# Patient Record
Sex: FEMALE | Race: WHITE
[De-identification: names, ages, dates, MRNs, and addresses within clinical notes are randomized per-mention and may not be internally consistent; named-entity substitution may affect disease eponyms.]

---

## 2019-12-27 ENCOUNTER — HOSPITAL ENCOUNTER (EMERGENCY)
Dept: HOSPITAL 76 - ED | Age: 54
Discharge: HOME | End: 2019-12-27
Payer: MEDICAID

## 2019-12-27 VITALS — SYSTOLIC BLOOD PRESSURE: 109 MMHG | DIASTOLIC BLOOD PRESSURE: 53 MMHG

## 2019-12-27 DIAGNOSIS — H81.12: Primary | ICD-10-CM

## 2019-12-27 PROCEDURE — 99284 EMERGENCY DEPT VISIT MOD MDM: CPT

## 2019-12-27 PROCEDURE — 99282 EMERGENCY DEPT VISIT SF MDM: CPT

## 2019-12-27 NOTE — ED PHYSICIAN DOCUMENTATION
History of Present Illness





- Stated complaint


Stated Complaint: VOMITING





- Additonal information


Additional information: 





This is a 54-year-old female with a history of arthritis who presents with 

vertigo.  Patient states this morning she became vertiginous and if she moves 

her head suddenly she gets very nauseated and the room began spinning.  It calms

down if she stays still.  It is worse when she looks to the left.  She has had 

multiple episodes of vomiting after moving her head.  She denies abdominal pain,

chest pain, weakness numbness or vision changes.





Review of Systems


Constitutional: denies: Fever


Cardiac: denies: Chest pain / pressure


Respiratory: denies: Dyspnea


GI: denies: Abdominal Pain


: denies: Dysuria


Neurologic: denies: Generalized weakness





PD PAST MEDICAL HISTORY





- Past Medical History


Musculoskeletal: Osteoarthritis





- Present Medications


Home Medications: 


                                Ambulatory Orders











 Medication  Instructions  Recorded  Confirmed


 


Meclizine HCl 25 mg PO TID PRN #14 tab.chew 12/27/19 














- Allergies


Allergies/Adverse Reactions: 


                                    Allergies











Allergy/AdvReac Type Severity Reaction Status Date / Time


 


ibuprofen AdvReac  Unknown Verified 12/27/19 08:17














PD ED PE NORMAL





- Vitals


Vital signs reviewed: Yes





- General


General: Alert and oriented X 3, No acute distress





- HEENT


HEENT: PERRL





- Neck


Neck: Supple, no meningeal sign





- Cardiac


Cardiac: RRR





- Respiratory


Respiratory: Clear bilaterally





- Abdomen


Abdomen: Soft, Non tender, Non distended





- Derm


Derm: Warm and dry





- Extremities


Extremities: No deformity





- Neuro


Neuro: Alert and oriented X 3, CNs 2-12 intact, No motor deficit, No sensory 

deficit, Normal speech, Other (Positive [place]-Hallpike with nystagmus, vertigo, 

and vomiting on the left.  Negative on the right. No dysmetria or ataxia.)





- Psych


Psych: Normal mood, Normal affect





Results





- Vitals


Vitals: 


                               Vital Signs - 24 hr











  12/27/19 12/27/19





  08:12 08:17


 


Temperature 36.6 C 36.4 C L


 


Heart Rate 74 78


 


Respiratory 18 16





Rate  


 


Blood Pressure 116/60 109/53 L


 


O2 Saturation 92 91 L








                                     Oxygen











O2 Source                      Room air

















PD MEDICAL DECISION MAKING





- ED course


ED course: 





Patient presents with positional vertigo symptoms.  On exam she has a completely

normal neurologic exam, and her symptoms are fine at rest but when I do a 

[place]-Hallpike maneuver on the left she is floridly positive with nystagmus and 

vomiting.  She has no signs of central vertigo, this appears to be benign 

positional vertigo.  She is able to ambulate, she has no dysmetria or ataxia. 

After meclizine she is feeling greatly improved.  On repeat examination she 

feels comfortable and home.  I discussed treatment with meclizine as well as 

Epley maneuvers, I provided a instructional handout on Epley maneuvers that she 

would prefer not to do them here in the emergency department today.  I discussed

PCP follow-up and ENT follow-up if she is having persistent vertigo despite our 

treatment.  I also discussed return precautions for any signs of central vertigo

or stroke, such as weakness numbness facial droop, or any other new or 

concerning symptoms.  Patient agrees this plan was discharged home in care of 

family





Departure





- Departure


Disposition: 01 Home, Self Care


Clinical Impression: 


BPPV (benign paroxysmal positional vertigo)


Qualifiers:


 Laterality: left Qualified Code(s): H81.12 - Benign paroxysmal vertigo, left 

ear





Condition: Good


Instructions:  Vertigo Paroxysmal Positional


Follow-Up: 


Your,PCP [Other] - Within 1 week (With any persistent symptoms)


Prescriptions: 


Meclizine HCl 25 mg PO TID PRN #14 tab.chew


 PRN Reason: Vertigo


Comments: 


You have positional vertigo.  This is caused by crystals in the inner ear.  You 

may use the meclizine for symptoms, please also perform the Epley maneuvers, 

which are explained on the attached handout.  There are also some good YouTube 

videos on how to do these Epley maneuvers.  You should perform them several 

times until your symptoms are greatly improved or completely resolved.  If you 

are developing any new concerning symptoms such as weakness, numbness, facial 

droop, slurred speech, or confusion, return to the emergency department 

immediate.  Otherwise you may follow-up with your primary care provider.